# Patient Record
Sex: FEMALE | Employment: OTHER | URBAN - METROPOLITAN AREA
[De-identification: names, ages, dates, MRNs, and addresses within clinical notes are randomized per-mention and may not be internally consistent; named-entity substitution may affect disease eponyms.]

---

## 2017-09-15 ENCOUNTER — OFFICE VISIT (OUTPATIENT)
Dept: TRANSPLANT | Facility: CLINIC | Age: 77
End: 2017-09-15
Attending: INTERNAL MEDICINE
Payer: COMMERCIAL

## 2017-09-15 VITALS
DIASTOLIC BLOOD PRESSURE: 81 MMHG | SYSTOLIC BLOOD PRESSURE: 108 MMHG | HEIGHT: 69 IN | HEART RATE: 95 BPM | BODY MASS INDEX: 21.06 KG/M2 | WEIGHT: 142.19 LBS

## 2017-09-15 DIAGNOSIS — Z87.19 H/O ESOPHAGEAL SPASM: ICD-10-CM

## 2017-09-15 DIAGNOSIS — I10 ESSENTIAL HYPERTENSION: ICD-10-CM

## 2017-09-15 DIAGNOSIS — I77.9 CAROTID DISEASE, BILATERAL: Primary | ICD-10-CM

## 2017-09-15 DIAGNOSIS — E78.2 MIXED HYPERLIPIDEMIA: ICD-10-CM

## 2017-09-15 PROCEDURE — 99999 PR PBB SHADOW E&M-EST. PATIENT-LVL III: CPT | Mod: PBBFAC,,, | Performed by: INTERNAL MEDICINE

## 2017-09-15 PROCEDURE — 99214 OFFICE O/P EST MOD 30 MIN: CPT | Mod: S$GLB,,, | Performed by: INTERNAL MEDICINE

## 2017-09-15 PROCEDURE — 1159F MED LIST DOCD IN RCRD: CPT | Mod: S$GLB,,, | Performed by: INTERNAL MEDICINE

## 2017-09-15 PROCEDURE — 3079F DIAST BP 80-89 MM HG: CPT | Mod: S$GLB,,, | Performed by: INTERNAL MEDICINE

## 2017-09-15 PROCEDURE — 3074F SYST BP LT 130 MM HG: CPT | Mod: S$GLB,,, | Performed by: INTERNAL MEDICINE

## 2017-09-15 PROCEDURE — 3008F BODY MASS INDEX DOCD: CPT | Mod: S$GLB,,, | Performed by: INTERNAL MEDICINE

## 2017-09-15 RX ORDER — ALPRAZOLAM 0.25 MG/1
0.25 TABLET ORAL NIGHTLY
COMMUNITY

## 2017-09-15 RX ORDER — ALPRAZOLAM 0.5 MG/1
0.5 TABLET, ORALLY DISINTEGRATING ORAL DAILY
COMMUNITY

## 2017-09-15 NOTE — PROGRESS NOTES
Subjective:     Patient ID:  Nichole Hidalgo is a 77 y.o. female who presents for follow-up of Hypertension and Hyperlipidemia (Last seen in clinic 2.5 years ago)    HPI:   76 yo WF with hx of HBP and lipid disorder with FHx of CV disease though not premature.  Sister  stage 4 ovarian CA at age 79 yrs.  Approximately 2 weeks ago UTI treated by antibiotics followed by colitis with bleeding, managed medically.  Cardiac wise has been doing well.  No active CV symptoms.  Umu, grand-daughter is going to have a baby on Monday.      Past Medical History:   Diagnosis Date    Carotid disease, bilateral 2017    Carotid u/s 17 with Dr. Dianna almaraz hemodynamically insignificant plaque    Cataract     Colitis 2017    H/O esophageal spasm 4/15/2015    Hyperlipidemia     Hypertension        Past Surgical History:   Procedure Laterality Date    ADENOIDECTOMY  2012    BREAST SURGERY   &     HYSTERECTOMY      left elbow surgery  2014    Dr. Jayy [id number]    PARATHYROIDECTOMY      SHOULDER ARTHROSCOPY         Family History   Problem Relation Age of Onset    Heart disease Mother     Stroke Mother     Cancer Father     Cancer Sister      Social History    Marital status:      Social History Main Topics    Smoking status: Former Smoker    Smokeless tobacco: Never Used    Alcohol use Yes      Comment: very infrequent    Drug use: No    Sexual activity: Not Asked     Current Outpatient Prescriptions   Medication Sig Dispense Refill    alprazolam (XANAX) 0.25 MG tablet Take 0.25 mg by mouth every evening.      alprazolam ODT (NIRAVAM) 0.5 MG TbDL 0.5 mg by Per OG tube route once daily.      amlodipine (NORVASC) 10 MG tablet Take 10 mg by mouth once daily.      aspirin 325 MG tablet Take 325 mg by mouth 2 (two) times daily.      desloratadine (CLARINEX) 5 mg tablet Take 5 mg by mouth once daily.      esomeprazole (NEXIUM) 40 MG capsule Take 40 mg by  mouth once daily.       hydrALAZINE (APRESOLINE) 25 MG tablet Take 25 mg by mouth every 12 (twelve) hours.   0    lisinopril (PRINIVIL,ZESTRIL) 20 MG tablet Take 20 mg by mouth once daily.   2    montelukast (SINGULAIR) 10 mg tablet Take 10 mg by mouth once daily.      multivitamin capsule Take 1 capsule by mouth once daily.      phenylephrine (SUDAFED PE) 10 MG Tab Take 10 mg by mouth 2 (two) times daily as needed.       pravastatin (PRAVACHOL) 40 MG tablet Take 40 mg by mouth once daily.   0    calcium carbonate (OS-MATEO) 600 mg (1,500 mg) Tab Take 600 mg by mouth 2 (two) times daily with meals.      fish oil-omega-3 fatty acids 300-1,000 mg capsule Take 1,000 mg by mouth 2 (two) times daily.       Review of patient's allergies indicates:   Allergen Reactions    Jon-1     Demerol [meperidine]      Review of Systems   Constitution: Positive for weight loss (over 3 years lost from 190# to 140# with help of Dr. Bustamante). Negative for chills, fever, weakness, malaise/fatigue, night sweats and weight gain.   Cardiovascular: Negative for chest pain, dyspnea on exertion, irregular heartbeat, leg swelling, near-syncope, orthopnea, palpitations, paroxysmal nocturnal dyspnea and syncope.   Respiratory: Negative for cough, sputum production and wheezing.    Hematologic/Lymphatic: Does not bruise/bleed easily.   Musculoskeletal: Positive for back pain (spinal stensosis and 5 bulging discs), neck pain and stiffness. Negative for arthritis and joint pain.   Gastrointestinal: Positive for abdominal pain (since colitis some soreness left side scheduled for colonoscopy). Negative for hematochezia and melena.        Recovered from colitis   Genitourinary: Negative for hematuria.        Recovered from UTI   Neurological: Negative for brief paralysis, focal weakness and seizures.     Objective:   Physical Exam   Constitutional: She is oriented to person, place, and time. She appears well-developed and well-nourished. No  "distress.   /81   Pulse 95   Ht 5' 9" (1.753 m)   Wt 64.5 kg (142 lb 3.2 oz)   BMI 21.00 kg/m²    HENT:   Head: Normocephalic and atraumatic.   Eyes: Conjunctivae are normal.   Neck: No JVD present. No tracheal deviation present. No thyromegaly present.   Cardiovascular: Normal rate and regular rhythm.  Exam reveals no gallop.    Murmur (Grade 1/6 systolic murmur LLSB to apex) heard.  Pulmonary/Chest: Effort normal and breath sounds normal.   Abdominal: Soft. Bowel sounds are normal. She exhibits no distension. There is no tenderness. There is no rebound and no guarding.   Musculoskeletal: She exhibits no edema or tenderness.   Neurological: She is alert and oriented to person, place, and time.   Skin: Skin is warm and dry. She is not diaphoretic.   Psychiatric: She has a normal mood and affect. Her behavior is normal. Judgment and thought content normal.      Assessment:     1. Carotid disease, bilateral    2. Mixed hyperlipidemia    3. Essential hypertension    4. H/O esophageal spasm      Plan:   Do not lose any more weight  No need for cardiac testing  Recent labs with Dr. Bustamante    ADDENDUM AFTER REVIEW OF OUTSIDE RECORDS:  Carotid u/s 6/26/17 without hemodynamically insignificant plaque  6/26/17 CMP OK with minimal elevation Ca 10.5--upper normal 10.4               CBC Hgb minimally low 11.4  11/18/16 lipids total chol 201    HDL 68  LDL 96 on statin    FINAL RECOMMENDATION FOR DR. BUSTAMANTE:  High intensity statin or target LDL < 70 (other option just high intensity statin would be to change pravastatin to atorvastatin 40 to 80 mg bedtime or rosuvastatin 20-40 mg bedtime  "

## 2017-09-21 PROBLEM — I77.9 CAROTID DISEASE, BILATERAL: Status: ACTIVE | Noted: 2017-09-21
